# Patient Record
Sex: MALE | URBAN - METROPOLITAN AREA
[De-identification: names, ages, dates, MRNs, and addresses within clinical notes are randomized per-mention and may not be internally consistent; named-entity substitution may affect disease eponyms.]

---

## 2019-12-09 ENCOUNTER — EMERGENCY (EMERGENCY)
Facility: HOSPITAL | Age: 19
LOS: 0 days | Discharge: HOME | End: 2019-12-09
Attending: EMERGENCY MEDICINE
Payer: MEDICAID

## 2019-12-09 VITALS
RESPIRATION RATE: 18 BRPM | SYSTOLIC BLOOD PRESSURE: 117 MMHG | TEMPERATURE: 99 F | HEART RATE: 77 BPM | WEIGHT: 121.03 LBS | OXYGEN SATURATION: 100 % | DIASTOLIC BLOOD PRESSURE: 81 MMHG | HEIGHT: 71 IN

## 2019-12-09 DIAGNOSIS — T39.1X1A POISONING BY 4-AMINOPHENOL DERIVATIVES, ACCIDENTAL (UNINTENTIONAL), INITIAL ENCOUNTER: ICD-10-CM

## 2019-12-09 DIAGNOSIS — T51.8X1A TOXIC EFFECT OF OTHER ALCOHOLS, ACCIDENTAL (UNINTENTIONAL), INITIAL ENCOUNTER: ICD-10-CM
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
## 2019-12-09 PROCEDURE — 93010 ELECTROCARDIOGRAM REPORT: CPT

## 2019-12-09 PROCEDURE — 99285 EMERGENCY DEPT VISIT HI MDM: CPT

## 2019-12-09 PROCEDURE — 99283 EMERGENCY DEPT VISIT LOW MDM: CPT

## 2019-12-09 PROCEDURE — 93005 ELECTROCARDIOGRAM TRACING: CPT

## 2019-12-09 NOTE — ED PROVIDER NOTE - OBJECTIVE STATEMENT
18 y/o M with no PMHx BIB mother for suspected overdose on Nyquil and alcohol.  Pt states that he drank most of 1 Taiwo 45 at a party between 2100 and 2200 then went home.  He was unable to fall asleep so he filled the plastic cup for the Nyquil to the 30 mL line twice and drank the contents.  He denies drinking any more than these 2 doses or taking any other source of Acetaminophen.  He denies taking any other prescription or OTC medication or illicit drugs.  He denies any history of depression and states he took the Nyquil to help him sleep.  He denies ever having any SI or HI. Pt is currently asymptomatic, alert and in NAD.

## 2019-12-09 NOTE — ED ADULT TRIAGE NOTE - CHIEF COMPLAINT QUOTE
As per friends, patient went to a party and drank alcohol. When patient went home he couldn't fall asleep so he drank Nyquil approx 1/2 bottle. Patient has no symptoms, states he just feels tired and wants to go to sleep. Patient in NAD in triage. Pt denies SI.

## 2019-12-09 NOTE — ED PROVIDER NOTE - CLINICAL SUMMARY MEDICAL DECISION MAKING FREE TEXT BOX
Otherwise healthy 20 y/o M presents after taking twice the standard dose of Nyquil which would add up to 1300 mg of Acetaminophen with one 40 oz beer over the course of 4 hours.  Pt is asymptomatic and has no VS abnormalities.  Pt hasn't taken any toxic dose of medication but will get screening EKG to evaluate QRS duration and QT interval prior to d/c

## 2019-12-09 NOTE — ED ADULT NURSE NOTE - OBJECTIVE STATEMENT
Pt presents to Ed after drinking alcohol and taking Nyquil.  Pt reports no symptoms at this time.  Pt alert and oriented x 3.

## 2019-12-09 NOTE — ED PROVIDER NOTE - PATIENT PORTAL LINK FT
You can access the FollowMyHealth Patient Portal offered by [facility] by registering at the following website: [email or web address]. By joining [facility]’s FollowMyHealth portal, you will also be able to view your health information using other applications (apps) compatible with our system.

## 2019-12-09 NOTE — ED PROVIDER NOTE - NSFOLLOWUPINSTRUCTIONS_ED_ALL_ED_FT
Tavares catheter inserted with Shelly MYLES RN. Perineal care performed prior to procedure. Sterile technique maintained. Pt tolerated.   Alcohol Abuse    Alcohol intoxication occurs when the amount of alcohol that a person has consumed impairs his or her ability to mentally and physically function. Chronic alcohol consumption can also lead to a variety of health issues including neurological disease, stomach disease, heart disease, liver disease, etc. Do not drive after drinking alcohol. Drinking enough alcohol to end up in an Emergency Room suggests you may have an alcohol abuse problem. Seek help at a drug addiction center.    SEEK IMMEDIATE MEDICAL CARE IF YOU HAVE ANY OF THE FOLLOWING SYMPTOMS: seizures, vomiting blood, blood in your stool, lightheadedness/dizziness, or becoming shaky to tremulous when you stop drinking.

## 2022-08-29 NOTE — ED ADULT NURSE NOTE - NS ED NURSE RECORD ANOTHER HT AND WT
The patient is Stable - Low risk of patient condition declining or worsening    Shift Goals  Clinical Goals: Hgb to remain greater or equal to 7 throughout this shift, vitals to remain stable  Patient Goals: Sleep comfortably before procedure tomorrow    Progress made toward(s) clinical / shift goals:  Pt is progressing, VSS and hgb remains stable    Patient is not progressing towards the following goals:       Yes
